# Patient Record
Sex: FEMALE | Race: WHITE | Employment: STUDENT
[De-identification: names, ages, dates, MRNs, and addresses within clinical notes are randomized per-mention and may not be internally consistent; named-entity substitution may affect disease eponyms.]

---

## 2022-11-26 ENCOUNTER — HEALTH MAINTENANCE LETTER (OUTPATIENT)
Age: 57
End: 2022-11-26

## 2023-01-12 ENCOUNTER — OFFICE VISIT (OUTPATIENT)
Dept: OTOLARYNGOLOGY | Facility: CLINIC | Age: 58
End: 2023-01-12
Payer: COMMERCIAL

## 2023-01-12 VITALS
DIASTOLIC BLOOD PRESSURE: 76 MMHG | RESPIRATION RATE: 18 BRPM | OXYGEN SATURATION: 99 % | HEART RATE: 62 BPM | SYSTOLIC BLOOD PRESSURE: 115 MMHG

## 2023-01-12 DIAGNOSIS — J01.01 ACUTE RECURRENT MAXILLARY SINUSITIS: Primary | ICD-10-CM

## 2023-01-12 PROCEDURE — 99203 OFFICE O/P NEW LOW 30 MIN: CPT | Performed by: OTOLARYNGOLOGY

## 2023-01-12 NOTE — LETTER
1/12/2023         RE: Nuzhat Allen  [address] [facility] [address]        Dear Colleague,    Thank you for referring your patient, Nuzhat Allen, to the [facility]. Please see a copy of my visit note below.    Chief Complaint - sinusitis    History of Present Illness - Nuzhat Allen is a 57 year old female who presents for evaluation of possible recurrent sinusitis. The patient describes symptoms of sinus infections with colds. She gets sick like a virus, but then it progresses to a sinus infection that lasts for a few weeks. She does rinses. She gets about 2-3 sinus infections per year. In between infections she feels fine. The patient notes no seasonal allergies. She has autoimmune conditions and really has to watch her diet.     Past Medical History -   - She has psoriasis and celiac.     Allergies -   Allergies   Allergen Reactions     No Known Drug Allergies        Social History -   Social History     Socioeconomic History     Marital status:        Family History -   Family History   Problem Relation Age of Onset     Hypertension Mother      Arthritis Mother      Arthritis Father         Psoriatic arthritis     Lipids Mother      Heart Disease Mother         Arrhythmia       Physical Exam  General - The patient is nontoxic, in no distress. Alert and oriented to person and place, answers questions and cooperates with examination appropriately.   Neurologic - CN II-XII are intact. No focal neurologic deficits.   Voice and Breathing - The patient was breathing comfortably without the use of accessory muscles. There was no wheezing, stridor, or stertor.  The patients voice was clear and strong.  Eyes - Extraocular movements intact.  Sclera were not icteric or injected, conjunctiva were pink and moist.  Mouth - Examination of the oral cavity showed pink, healthy oral mucosa. No lesions or ulcerations noted.  The tongue was mobile and midline.  Throat - The walls of  the oropharynx were smooth, symmetric, and had no lesions or ulcerations.  clear postnasal drainage.  The uvula was midline on elevation.  Ears - Bilateral pinna and EACs with normal appearing overlying skin. Tympanic membrane intact bilaterally. Bony landmarks of the ossicular chain are normal. No retraction, perforation, or masses.  No fluid or purulence was seen in the external canal or the middle ear.   Nose - External contour is symmetric, no gross deflection or scars. Nasal mucosa is pink and moist with no abnormal mucus.  The septum was midline and non-obstructive, turbinates of normal size and position.  No polyps, masses, or purulence noted on examination.  Neck - Soft, non-tender. Palpation of the occipital, submental, submandibular, internal jugular chain, and supraclavicular nodes did not demonstrate any abnormal lymph nodes or masses. No parotid masses. Palpation of the thyroid was soft and smooth, with no nodules or goiter appreciated.  The trachea was mobile and midline.  Cardiovascular - carotid pulses are 2+ bilaterally, regular rhythm        A/P - Nuzhat Allen is a 57 year old female with recurrent sinusitis, 2-3 times a year. I recommend a CT scan to determine if the patient is having sinusitis and to visualize the anatomy. We will review the study at that time to determine any evidence of sinus disease that might be helped with surgical intervention. She seems interested in balloon sinuplasty.       Tamir Peraza MD  Otolaryngology  [facility]        Again, thank you for allowing me to participate in the care of your patient.        Sincerely,        Tamir Peraza MD

## 2023-01-12 NOTE — PROGRESS NOTES
Chief Complaint - sinusitis    History of Present Illness - Nuzhat Allen is a 57 year old female who presents for evaluation of possible recurrent sinusitis. The patient describes symptoms of sinus infections with colds. She gets sick like a virus, but then it progresses to a sinus infection that lasts for a few weeks. She does rinses. She gets about 2-3 sinus infections per year. In between infections she feels fine. The patient notes no seasonal allergies. She has autoimmune conditions and really has to watch her diet.     Past Medical History -   - She has psoriasis and celiac.     Allergies -   Allergies   Allergen Reactions     No Known Drug Allergies        Social History -   Social History     Socioeconomic History     Marital status:        Family History -   Family History   Problem Relation Age of Onset     Hypertension Mother      Arthritis Mother      Arthritis Father         Psoriatic arthritis     Lipids Mother      Heart Disease Mother         Arrhythmia       Physical Exam  General - The patient is nontoxic, in no distress. Alert and oriented to person and place, answers questions and cooperates with examination appropriately.   Neurologic - CN II-XII are intact. No focal neurologic deficits.   Voice and Breathing - The patient was breathing comfortably without the use of accessory muscles. There was no wheezing, stridor, or stertor.  The patients voice was clear and strong.  Eyes - Extraocular movements intact.  Sclera were not icteric or injected, conjunctiva were pink and moist.  Mouth - Examination of the oral cavity showed pink, healthy oral mucosa. No lesions or ulcerations noted.  The tongue was mobile and midline.  Throat - The walls of the oropharynx were smooth, symmetric, and had no lesions or ulcerations.  clear postnasal drainage.  The uvula was midline on elevation.  Ears - Bilateral pinna and EACs with normal appearing overlying skin. Tympanic membrane intact bilaterally.  Bony landmarks of the ossicular chain are normal. No retraction, perforation, or masses.  No fluid or purulence was seen in the external canal or the middle ear.   Nose - External contour is symmetric, no gross deflection or scars. Nasal mucosa is pink and moist with no abnormal mucus.  The septum was midline and non-obstructive, turbinates of normal size and position.  No polyps, masses, or purulence noted on examination.  Neck - Soft, non-tender. Palpation of the occipital, submental, submandibular, internal jugular chain, and supraclavicular nodes did not demonstrate any abnormal lymph nodes or masses. No parotid masses. Palpation of the thyroid was soft and smooth, with no nodules or goiter appreciated.  The trachea was mobile and midline.  Cardiovascular - carotid pulses are 2+ bilaterally, regular rhythm        A/P - Nuzhat Allen is a 57 year old female with recurrent sinusitis, 2-3 times a year. I recommend a CT scan to determine if the patient is having sinusitis and to visualize the anatomy. We will review the study at that time to determine any evidence of sinus disease that might be helped with surgical intervention. She seems interested in balloon sinuplasty.       Tamir Peraza MD  Otolaryngology  [facility]

## 2024-01-06 ENCOUNTER — HEALTH MAINTENANCE LETTER (OUTPATIENT)
Age: 59
End: 2024-01-06

## 2024-12-21 ENCOUNTER — HEALTH MAINTENANCE LETTER (OUTPATIENT)
Age: 59
End: 2024-12-21

## 2025-01-25 ENCOUNTER — HEALTH MAINTENANCE LETTER (OUTPATIENT)
Age: 60
End: 2025-01-25